# Patient Record
Sex: MALE | Race: BLACK OR AFRICAN AMERICAN | NOT HISPANIC OR LATINO | Employment: FULL TIME | URBAN - NONMETROPOLITAN AREA
[De-identification: names, ages, dates, MRNs, and addresses within clinical notes are randomized per-mention and may not be internally consistent; named-entity substitution may affect disease eponyms.]

---
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
## 2022-02-01 ENCOUNTER — LAB (OUTPATIENT)
Dept: LAB | Facility: HOSPITAL | Age: 22
End: 2022-02-01

## 2022-02-01 ENCOUNTER — OFFICE VISIT (OUTPATIENT)
Dept: FAMILY MEDICINE CLINIC | Facility: CLINIC | Age: 22
End: 2022-02-01

## 2022-02-01 VITALS
HEIGHT: 70 IN | WEIGHT: 150.5 LBS | OXYGEN SATURATION: 99 % | SYSTOLIC BLOOD PRESSURE: 130 MMHG | TEMPERATURE: 97.1 F | BODY MASS INDEX: 21.55 KG/M2 | DIASTOLIC BLOOD PRESSURE: 68 MMHG | HEART RATE: 86 BPM

## 2022-02-01 DIAGNOSIS — A64 STD (MALE): ICD-10-CM

## 2022-02-01 DIAGNOSIS — A64 STD (MALE): Primary | ICD-10-CM

## 2022-02-01 DIAGNOSIS — F98.8 MASTURBATION: ICD-10-CM

## 2022-02-01 LAB — KOH PREP NAIL: NORMAL

## 2022-02-01 PROCEDURE — 87591 N.GONORRHOEAE DNA AMP PROB: CPT

## 2022-02-01 PROCEDURE — 87491 CHLMYD TRACH DNA AMP PROBE: CPT

## 2022-02-01 PROCEDURE — 87086 URINE CULTURE/COLONY COUNT: CPT

## 2022-02-01 PROCEDURE — 87661 TRICHOMONAS VAGINALIS AMPLIF: CPT

## 2022-02-01 PROCEDURE — 81003 URINALYSIS AUTO W/O SCOPE: CPT

## 2022-02-01 PROCEDURE — 86592 SYPHILIS TEST NON-TREP QUAL: CPT

## 2022-02-01 PROCEDURE — 36415 COLL VENOUS BLD VENIPUNCTURE: CPT

## 2022-02-01 PROCEDURE — 86696 HERPES SIMPLEX TYPE 2 TEST: CPT

## 2022-02-01 PROCEDURE — 86695 HERPES SIMPLEX TYPE 1 TEST: CPT

## 2022-02-01 PROCEDURE — G0432 EIA HIV-1/HIV-2 SCREEN: HCPCS

## 2022-02-01 PROCEDURE — 87220 TISSUE EXAM FOR FUNGI: CPT | Performed by: STUDENT IN AN ORGANIZED HEALTH CARE EDUCATION/TRAINING PROGRAM

## 2022-02-01 PROCEDURE — 99203 OFFICE O/P NEW LOW 30 MIN: CPT | Performed by: STUDENT IN AN ORGANIZED HEALTH CARE EDUCATION/TRAINING PROGRAM

## 2022-02-01 NOTE — PROGRESS NOTES
Family Medicine Residency  Pilar Brumfield MD    Subjective:     Nain Ferreira Jr. is a 21 y.o. male who presents for concern for STD.  Patient states that for the past week he has noticed white fluid come out from his penis.  States that after a few days it has been hurting him because has felt sticky and drying.  Earlier today he noticed some blood after urinating; he got concerned so he came to the PCP.  He is sexually active with females only.  In the past year he has had 3 sexual partners.  Reports inconsistent use of condoms.  Has dysuria when he urinates.  He states that after he broke up with his last partner, he started masturbating 3-4 times a day because he was bored on quarantine for COVID-19 infection    The following portions of the patient's history were reviewed and updated as appropriate: allergies, current medications, past family history, past medical history, past social history, past surgical history and problem list.    Past Medical Hx:  Past Medical History:   Diagnosis Date   • Acute gastroenteritis    • Acute pharyngitis    • Ankle pain    • Astigmatism    • Bronchitis    • Conjunctivitis    • COVID-19    • Insect bite     wound - History of bee sting, right eye   • Myopia    • Otitis media    • Sprain of ankle    • Tinea capitis    • Upper respiratory infection    • Urinary tract disease    • Urinary tract infectious disease    • Vulvovaginitis     HISTORY OF       Past Surgical Hx:  Past Surgical History:   Procedure Laterality Date   • WRIST SURGERY Right        Current Meds:  No current outpatient medications on file.    Allergies:  No Known Allergies    Family Hx:  Family History   Problem Relation Age of Onset   • Cancer Other    • Diabetes Other    • Heart disease Other    • Glaucoma Other         Social History:  Social History     Socioeconomic History   • Marital status: Single   Tobacco Use   • Smoking status: Never Smoker   • Smokeless tobacco: Never Used   Substance and  "Sexual Activity   • Alcohol use: No   • Drug use: No       Review of Systems  Review of Systems   Constitutional: Negative for chills and fever.   HENT: Negative for facial swelling, nosebleeds and trouble swallowing.    Eyes: Negative for photophobia and visual disturbance.   Respiratory: Negative for choking and stridor.    Gastrointestinal: Negative for abdominal distention, diarrhea, nausea and vomiting.   Genitourinary: Positive for dysuria, penile discharge and penile pain. Negative for difficulty urinating.   Musculoskeletal: Negative for arthralgias, gait problem and myalgias.   Skin: Negative for pallor and rash.   Neurological: Negative for seizures and syncope.   Psychiatric/Behavioral: Negative for dysphoric mood and hallucinations.       Objective:     /68   Pulse 86   Temp 97.1 °F (36.2 °C)   Ht 177.8 cm (70\")   Wt 68.3 kg (150 lb 8 oz)   SpO2 99%   BMI 21.59 kg/m²   Physical Exam  Constitutional:       General: He is not in acute distress.     Appearance: He is well-developed.   HENT:      Head: Normocephalic and atraumatic.      Nose: Nose normal.   Eyes:      Conjunctiva/sclera: Conjunctivae normal.      Pupils: Pupils are equal, round, and reactive to light.   Cardiovascular:      Rate and Rhythm: Normal rate and regular rhythm.      Pulses: Normal pulses.      Heart sounds: Normal heart sounds.   Pulmonary:      Effort: Pulmonary effort is normal.   Abdominal:      General: Bowel sounds are normal.      Palpations: Abdomen is soft.   Genitourinary:     Penis: Normal.       Testes: Normal.   Musculoskeletal:         General: Normal range of motion.      Cervical back: Normal range of motion and neck supple.   Skin:     General: Skin is warm and dry.   Neurological:      Mental Status: He is alert. Mental status is at baseline.      Cranial Nerves: No cranial nerve deficit.      Coordination: Coordination normal.   Psychiatric:         Mood and Affect: Mood normal.         Behavior: " Behavior normal.          Assessment/Plan:     Diagnoses and all orders for this visit:    1. STD (male) (Primary)  -     HIV-1/O/2 ANTIGEN/ANTIBODY, 4TH GENERATION; Future  -     HSV 1 and 2-Specific Ab, IgG; Future  -     Wet Prep, Genital - Swab, Penis; Future  -     Trichomonas vaginalis, PCR - Urine, Urine, Clean Catch; Future  -     KOH Prep - Urine, Urine, Random Void  -     RPR; Future  -     Chlamydia trachomatis, Neisseria gonorrhoeae, Trichomonas vaginalis, PCR - Urine, Urine, Clean Catch; Future    2. Masturbation  -     Urinalysis With Microscopic - Urine, Clean Catch; Future  -     Urinalysis With Culture If Indicated - Urine, Clean Catch; Future      -Will complete STD panel and urinalysis today.  Counseled on safe sex activities including consistent use of condoms.    Follow-up:     Return in about 4 weeks (around 3/1/2022) for Annual.    Preventative:  Health Maintenance   Topic Date Due   • ANNUAL PHYSICAL  Never done   • COVID-19 Vaccine (1) Never done   • HPV VACCINES (1 - Male 2-dose series) Never done   • HEPATITIS C SCREENING  Never done   • INFLUENZA VACCINE  Never done   • TDAP/TD VACCINES (2 - Td or Tdap) 07/19/2022   • Pneumococcal Vaccine 0-64  Aged Out   • MENINGOCOCCAL VACCINE  Aged Out         Alcohol use:  reports no history of alcohol use.  Nicotine status  reports that he has never smoked. He has never used smokeless tobacco.    Goals    None         RISK SCORE: 3          PGY-3  [facility]  This document has been electronically signed by Pilar Brumfield MD on February 1, 2022 16:06 CST

## 2022-02-02 ENCOUNTER — TELEPHONE (OUTPATIENT)
Dept: FAMILY MEDICINE CLINIC | Facility: CLINIC | Age: 22
End: 2022-02-02

## 2022-02-02 DIAGNOSIS — A54.9 NEISSERIA GONORRHEAE: Primary | ICD-10-CM

## 2022-02-02 DIAGNOSIS — N34.2 URETHRITIS: Primary | ICD-10-CM

## 2022-02-02 LAB
BILIRUB UR QL STRIP: NEGATIVE
C TRACH RRNA CVX QL NAA+PROBE: NEGATIVE
CLARITY UR: CLEAR
COLOR UR: YELLOW
GLUCOSE UR STRIP-MCNC: NEGATIVE MG/DL
HGB UR QL STRIP.AUTO: NEGATIVE
HIV1+2 AB SER QL: NORMAL
KETONES UR QL STRIP: ABNORMAL
LEUKOCYTE ESTERASE UR QL STRIP.AUTO: ABNORMAL
N GONORRHOEA RRNA SPEC QL NAA+PROBE: POSITIVE
NITRITE UR QL STRIP: NEGATIVE
PH UR STRIP.AUTO: 6.5 [PH] (ref 5–8)
PROT UR QL STRIP: ABNORMAL
RPR SER QL: NORMAL
SP GR UR STRIP: >1.03 (ref 1–1.03)
UROBILINOGEN UR QL STRIP: ABNORMAL

## 2022-02-02 RX ORDER — AZITHROMYCIN 500 MG/1
2000 TABLET, FILM COATED ORAL ONCE
Qty: 4 TABLET | Refills: 0 | Status: SHIPPED | OUTPATIENT
Start: 2022-02-02 | End: 2022-02-02

## 2022-02-02 RX ORDER — CEFTRIAXONE 1 G/1
500 INJECTION, POWDER, FOR SOLUTION INTRAMUSCULAR; INTRAVENOUS ONCE
Status: COMPLETED | OUTPATIENT
Start: 2022-02-04 | End: 2022-02-07

## 2022-02-02 RX ORDER — AZITHROMYCIN 500 MG/1
1000 TABLET, FILM COATED ORAL DAILY
Qty: 2 TABLET | Refills: 0 | Status: SHIPPED | OUTPATIENT
Start: 2022-02-02 | End: 2022-02-03

## 2022-02-02 NOTE — TELEPHONE ENCOUNTER
Young man seen in the office yesterday for urethral discharge and discomfort.  GC swab was positive.  Chlamydia was negative.  Dr. Dunn was off sick today and I was asked to notify the patient.  Called multiple times and this is an invalid number.  His work #[id number] is also invalid.  We were going to send zithromax 2000mg to pharmacy.  Dr Brumfield will also try to reach patient.  .  A letter will be sent for him to call for the results of his labs

## 2022-02-02 NOTE — PROGRESS NOTES
I have spoken with the patient .     I have reviewed the notes, assessments, and/or procedures performed by Dr. Pilar Brumfield,   I concur with   his  documentation and assessment and plan for Nain Ferreira Jr..          This document has been electronically signed by Mohsen Sargent MD on February 2, 2022 09:07 CST

## 2022-02-03 LAB
BACTERIA SPEC AEROBE CULT: NO GROWTH
HSV1 IGG SER IA-ACNC: <0.91 INDEX (ref 0–0.9)
HSV2 IGG SER IA-ACNC: <0.91 INDEX (ref 0–0.9)
T VAGINALIS DNA SPEC QL NAA+PROBE: NEGATIVE
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
## 2022-02-07 ENCOUNTER — CLINICAL SUPPORT (OUTPATIENT)
Dept: FAMILY MEDICINE CLINIC | Facility: CLINIC | Age: 22
End: 2022-02-07

## 2022-02-07 ENCOUNTER — TELEPHONE (OUTPATIENT)
Dept: FAMILY MEDICINE CLINIC | Facility: CLINIC | Age: 22
End: 2022-02-07

## 2022-02-07 PROCEDURE — 96372 THER/PROPH/DIAG INJ SC/IM: CPT | Performed by: STUDENT IN AN ORGANIZED HEALTH CARE EDUCATION/TRAINING PROGRAM

## 2022-02-07 RX ADMIN — CEFTRIAXONE 500 MG: 1 INJECTION, POWDER, FOR SOLUTION INTRAMUSCULAR; INTRAVENOUS at 15:30
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
## 2022-02-11 ENCOUNTER — TELEPHONE (OUTPATIENT)
Dept: FAMILY MEDICINE CLINIC | Facility: CLINIC | Age: 22
End: 2022-02-11

## 2022-02-11 NOTE — TELEPHONE ENCOUNTER
This patient has called stating he received a letter from Dr Brumfield, that read his test results were ready.   Please return his call at [phone].    Thank you,  Leslee

## 2022-05-24 ENCOUNTER — OFFICE VISIT (OUTPATIENT)
Dept: FAMILY MEDICINE CLINIC | Facility: CLINIC | Age: 22
End: 2022-05-24

## 2022-05-24 VITALS
DIASTOLIC BLOOD PRESSURE: 72 MMHG | SYSTOLIC BLOOD PRESSURE: 124 MMHG | HEART RATE: 96 BPM | OXYGEN SATURATION: 98 % | WEIGHT: 157.3 LBS | BODY MASS INDEX: 22.52 KG/M2 | HEIGHT: 70 IN

## 2022-05-24 DIAGNOSIS — F33.1 MODERATE EPISODE OF RECURRENT MAJOR DEPRESSIVE DISORDER: Primary | ICD-10-CM

## 2022-05-24 DIAGNOSIS — Z72.0 TOBACCO USE: ICD-10-CM

## 2022-05-24 DIAGNOSIS — Z00.00 HEALTH CARE MAINTENANCE: ICD-10-CM

## 2022-05-24 PROCEDURE — 99213 OFFICE O/P EST LOW 20 MIN: CPT | Performed by: STUDENT IN AN ORGANIZED HEALTH CARE EDUCATION/TRAINING PROGRAM

## 2022-05-24 RX ORDER — IBUPROFEN 800 MG/1
800 TABLET ORAL
COMMUNITY
End: 2022-10-29

## 2022-05-24 RX ORDER — FLUOXETINE 10 MG/1
10 CAPSULE ORAL DAILY
Qty: 30 CAPSULE | Refills: 0 | Status: SHIPPED | OUTPATIENT
Start: 2022-05-24 | End: 2022-06-27 | Stop reason: SDUPTHER

## 2022-05-24 NOTE — PROGRESS NOTES
Family Medicine Residency  Pilar Brumfield MD    Subjective:     Nain Ferreira Jr. is a 21 y.o. male who presents for:     Depression: phq 15. Previously had thoughts of harming self but never acted upon it. No suicidal issues now. Depressive Issues related to financial, personal life, relationships, normal every day things.     Tobacco use: vapes. Smoker since 17. 1 pack per day but recently cut down to 1-2 cigs per week but now picked up vaping.     Substance use: Marijuana every day. Multiple times a day to help build an appetite and fall asleep.     The following portions of the patient's history were reviewed and updated as appropriate: allergies, current medications, past family history, past medical history, past social history, past surgical history and problem list.    Past Medical Hx:  Past Medical History:   Diagnosis Date   • Acute gastroenteritis    • Acute pharyngitis    • Ankle pain    • Astigmatism    • Bronchitis    • Conjunctivitis    • COVID-19    • Insect bite     wound - History of bee sting, right eye   • Myopia    • Otitis media    • Sprain of ankle    • Tinea capitis    • Upper respiratory infection    • Urinary tract disease    • Urinary tract infectious disease    • Vulvovaginitis     HISTORY OF       Past Surgical Hx:  Past Surgical History:   Procedure Laterality Date   • SHOULDER SURGERY Left 2018   • WRIST SURGERY Right        Current Meds:    Current Outpatient Medications:   •  FLUoxetine (PROzac) 10 MG capsule, Take 1 capsule by mouth Daily for 30 days., Disp: 30 capsule, Rfl: 0  •  ibuprofen (ADVIL,MOTRIN) 800 MG tablet, Take 800 mg by mouth., Disp: , Rfl:   •  nicotine (Nicoderm CQ) 7 MG/24HR patch, Place 1 patch on the skin as directed by provider Daily for 30 days., Disp: 30 patch, Rfl: 0    Allergies:  No Known Allergies    Family Hx:  Family History   Problem Relation Age of Onset   • Cancer Other    • Diabetes Other    • Heart disease Other    • Glaucoma Other      "    Social History:  Social History     Socioeconomic History   • Marital status: Single   Tobacco Use   • Smoking status: Current Every Day Smoker   • Smokeless tobacco: Never Used   Vaping Use   • Vaping Use: Every day   Substance and Sexual Activity   • Alcohol use: No   • Drug use: No   • Sexual activity: Yes       Review of Systems  Review of Systems   Constitutional: Negative for chills and fever.   HENT: Negative for facial swelling, nosebleeds and trouble swallowing.    Eyes: Negative for photophobia and visual disturbance.   Respiratory: Negative for choking and stridor.    Gastrointestinal: Negative for abdominal distention, diarrhea, nausea and vomiting.   Genitourinary: Negative for difficulty urinating and dysuria.   Musculoskeletal: Negative for arthralgias, gait problem and myalgias.   Skin: Negative for pallor and rash.   Neurological: Negative for seizures and syncope.   Psychiatric/Behavioral: Negative for dysphoric mood and hallucinations.       Objective:     /72   Pulse 96   Ht 177.8 cm (70\")   Wt 71.4 kg (157 lb 4.8 oz)   SpO2 98%   BMI 22.57 kg/m²   Physical Exam  Constitutional:       General: He is not in acute distress.     Appearance: He is well-developed.   HENT:      Head: Normocephalic and atraumatic.      Nose: Nose normal.   Eyes:      Conjunctiva/sclera: Conjunctivae normal.      Pupils: Pupils are equal, round, and reactive to light.   Cardiovascular:      Rate and Rhythm: Normal rate and regular rhythm.      Pulses: Normal pulses.      Heart sounds: Normal heart sounds.   Pulmonary:      Effort: Pulmonary effort is normal.   Abdominal:      General: Bowel sounds are normal.      Palpations: Abdomen is soft.   Musculoskeletal:         General: Normal range of motion.      Cervical back: Normal range of motion and neck supple.   Skin:     General: Skin is warm and dry.   Neurological:      Mental Status: He is alert. Mental status is at baseline.      Cranial Nerves: No " cranial nerve deficit.      Coordination: Coordination normal.   Psychiatric:         Mood and Affect: Mood normal.         Behavior: Behavior normal.          Assessment/Plan:     Diagnoses and all orders for this visit:    1. Moderate episode of recurrent major depressive disorder (HCC) (Primary)  -     FLUoxetine (PROzac) 10 MG capsule; Take 1 capsule by mouth Daily for 30 days.  Dispense: 30 capsule; Refill: 0  -     Ambulatory Referral to Psychology  -     Ambulatory Referral to Behavioral Health    2. Health care maintenance  -     Hepatitis C antibody; Future    3. Tobacco use  -     nicotine (Nicoderm CQ) 7 MG/24HR patch; Place 1 patch on the skin as directed by provider Daily for 30 days.  Dispense: 30 patch; Refill: 0        -Provided Std counseling, advised on safe sex  -Start low dose prozac and refer to behavioral health  -Trial Nicotine patch     Follow-up:     Return in about 6 years (around 5/24/2028) for Recheck.    Preventative:  Health Maintenance   Topic Date Due   • Pneumococcal Vaccine 0-64 (1 - PPSV23 or PCV20) 04/01/2003   • ANNUAL PHYSICAL  Never done   • COVID-19 Vaccine (1) Never done   • HPV VACCINES (1 - Male 2-dose series) Never done   • HEPATITIS C SCREENING  Never done   • TDAP/TD VACCINES (2 - Td or Tdap) 07/19/2022   • INFLUENZA VACCINE  08/01/2022   • MENINGOCOCCAL VACCINE  Aged Out       Alcohol use:  reports no history of alcohol use.  Nicotine status  reports that he has been smoking. He has never used smokeless tobacco.     Goals    None         RISK SCORE: 3          PGY-3  [facility] Family Medicine Residency  This document has been electronically signed by Pilar Brumfield MD on May 27, 2022 12:39 CDT

## 2022-05-25 ENCOUNTER — TELEPHONE (OUTPATIENT)
Dept: FAMILY MEDICINE CLINIC | Facility: CLINIC | Age: 22
End: 2022-05-25

## 2022-06-27 DIAGNOSIS — F33.1 MODERATE EPISODE OF RECURRENT MAJOR DEPRESSIVE DISORDER: ICD-10-CM

## 2022-06-27 RX ORDER — FLUOXETINE 10 MG/1
10 CAPSULE ORAL DAILY
Qty: 30 CAPSULE | Refills: 0 | Status: SHIPPED | OUTPATIENT
Start: 2022-06-27 | End: 2022-06-28 | Stop reason: SDUPTHER

## 2022-06-28 DIAGNOSIS — F33.1 MODERATE EPISODE OF RECURRENT MAJOR DEPRESSIVE DISORDER: ICD-10-CM

## 2022-06-28 RX ORDER — FLUOXETINE 10 MG/1
10 CAPSULE ORAL DAILY
Qty: 30 CAPSULE | Refills: 0 | OUTPATIENT
Start: 2022-06-28 | End: 2022-10-29

## 2022-09-02 ENCOUNTER — OFFICE VISIT (OUTPATIENT)
Dept: FAMILY MEDICINE CLINIC | Facility: CLINIC | Age: 22
End: 2022-09-02

## 2022-09-02 VITALS
DIASTOLIC BLOOD PRESSURE: 70 MMHG | HEIGHT: 70 IN | SYSTOLIC BLOOD PRESSURE: 120 MMHG | BODY MASS INDEX: 23.08 KG/M2 | OXYGEN SATURATION: 99 % | TEMPERATURE: 96.2 F | HEART RATE: 79 BPM | WEIGHT: 161.2 LBS

## 2022-09-02 DIAGNOSIS — Z20.2 TRICHOMONAS EXPOSURE: Primary | ICD-10-CM

## 2022-09-02 PROCEDURE — 99213 OFFICE O/P EST LOW 20 MIN: CPT | Performed by: STUDENT IN AN ORGANIZED HEALTH CARE EDUCATION/TRAINING PROGRAM

## 2022-09-02 RX ORDER — METRONIDAZOLE 500 MG/1
2000 TABLET ORAL ONCE
Qty: 4 TABLET | Refills: 0 | Status: SHIPPED | OUTPATIENT
Start: 2022-09-02 | End: 2022-09-02

## 2022-09-02 NOTE — PROGRESS NOTES
Family Medicine Residency  Pato Mcgee MD    Subjective:     Nain Ferreira Jr. is a 21 y.o. male who presents for trichomonas exposure. Patient has no symptoms. Girlfriend has confirmed trichomoniasis.    Guidelines recommend treating sex partners of patients with confirmed trichomoniasis rather than observation. For males, guidelines recommend single dose treatment with metronidazole 2 grams taken once.     Medication ordered. Patient can follow up PRN.     Patient declines other STD testing.     The following portions of the patient's history were reviewed and updated as appropriate: allergies, current medications, past family history, past medical history, past social history, past surgical history and problem list.    Past Medical Hx:  Past Medical History:   Diagnosis Date   • Acute gastroenteritis    • Acute pharyngitis    • Ankle pain    • Astigmatism    • Bronchitis    • Conjunctivitis    • COVID-19    • Insect bite     wound - History of bee sting, right eye   • Myopia    • Otitis media    • Sprain of ankle    • Tinea capitis    • Upper respiratory infection    • Urinary tract disease    • Urinary tract infectious disease    • Vulvovaginitis     HISTORY OF       Past Surgical Hx:  Past Surgical History:   Procedure Laterality Date   • SHOULDER SURGERY Left 2018   • WRIST SURGERY Right        Current Meds:    Current Outpatient Medications:   •  ibuprofen (ADVIL,MOTRIN) 800 MG tablet, Take 800 mg by mouth., Disp: , Rfl:   •  FLUoxetine (PROzac) 10 MG capsule, Take 1 capsule by mouth Daily for 30 days., Disp: 30 capsule, Rfl: 0  •  metroNIDAZOLE (Flagyl) 500 MG tablet, Take 4 tablets by mouth 1 (One) Time for 1 dose., Disp: 4 tablet, Rfl: 0    Allergies:  No Known Allergies    Family Hx:  Family History   Problem Relation Age of Onset   • Cancer Other    • Diabetes Other    • Heart disease Other    • Glaucoma Other         Social History:  Social History     Socioeconomic History   • Marital status:  "Single   Tobacco Use   • Smoking status: Current Every Day Smoker   • Smokeless tobacco: Never Used   Vaping Use   • Vaping Use: Every day   Substance and Sexual Activity   • Alcohol use: No   • Drug use: No   • Sexual activity: Yes       Review of Systems  Review of Systems   Genitourinary: Negative for dysuria, penile discharge, penile pain, penile swelling, scrotal swelling and testicular pain.       Objective:     /70   Pulse 79   Temp 96.2 °F (35.7 °C)   Ht 177.8 cm (70\")   Wt 73.1 kg (161 lb 3.2 oz)   SpO2 99%   BMI 23.13 kg/m²   Physical Exam  Constitutional:       General: He is not in acute distress.     Appearance: Normal appearance. He is not ill-appearing, toxic-appearing or diaphoretic.   Pulmonary:      Effort: Pulmonary effort is normal. No respiratory distress.   Neurological:      Mental Status: He is alert.   Psychiatric:         Mood and Affect: Mood normal.         Behavior: Behavior normal.          Assessment/Plan:     Diagnoses and all orders for this visit:    1. Trichomonas exposure (Primary)      Nain Ferreira Jr. is a 21 y.o. male who presents for trichomonas exposure. Patient has no symptoms. Girlfriend has confirmed trichomoniasis.    Guidelines recommend treating sex partners of patients with confirmed trichomoniasis rather than observation. For males, guidelines recommend single dose treatment with metronidazole 2 grams taken once.     Medication ordered. Patient can follow up PRN.     Patient declines other STD testing.       -     metroNIDAZOLE (Flagyl) 500 MG tablet; Take 4 tablets by mouth 1 (One) Time for 1 dose.  Dispense: 4 tablet; Refill: 0        · Rx changes: one time 2 gram dose of metronidazole to treat male sexual partner of confirmed trichomoniasis infected woman      Follow-up:     Return if symptoms worsen or fail to improve.    Preventative:  Health Maintenance   Topic Date Due   • COVID-19 Vaccine (1) Never done   • Pneumococcal Vaccine 0-64 (1 - " PPSV23 or PCV20) 04/01/2003   • ANNUAL PHYSICAL  Never done   • HPV VACCINES (1 - Male 2-dose series) Never done   • HEPATITIS C SCREENING  Never done   • TDAP/TD VACCINES (2 - Td or Tdap) 07/19/2022   • INFLUENZA VACCINE  10/01/2022   • MENINGOCOCCAL VACCINE  Aged Out         Alcohol use:  reports no history of alcohol use.  Nicotine status  reports that he has been smoking. He has never used smokeless tobacco.     Goals    None         RISK SCORE: 4      This document has been electronically signed by Pato Mcgee MD on September 2, 2022 14:31 CDT

## 2022-09-02 NOTE — PROGRESS NOTES
I have reviewed the patient.  I have reviewed the notes, assessments, and/or procedures performed by Dr Pato Mcgee, I concur with his  documentation and assessment and plan for Nain Ferreira Jr..          This document has been electronically signed by Mohsen Sargent MD on September 2, 2022 16:09 CDT

## 2022-10-28 ENCOUNTER — HOSPITAL ENCOUNTER (EMERGENCY)
Facility: HOSPITAL | Age: 22
Discharge: HOME | End: 2022-10-29
Attending: EMERGENCY MEDICINE | Admitting: EMERGENCY MEDICINE

## 2022-10-28 DIAGNOSIS — S46.911A STRAIN OF RIGHT SHOULDER, INITIAL ENCOUNTER: Primary | ICD-10-CM

## 2022-10-28 PROCEDURE — 99283 EMERGENCY DEPT VISIT LOW MDM: CPT

## 2022-10-28 RX ORDER — HYDROCODONE BITARTRATE AND ACETAMINOPHEN 5; 325 MG/1; MG/1
1 TABLET ORAL ONCE
Status: COMPLETED | OUTPATIENT
Start: 2022-10-28 | End: 2022-10-29

## 2022-10-29 ENCOUNTER — APPOINTMENT (OUTPATIENT)
Dept: GENERAL RADIOLOGY | Facility: HOSPITAL | Age: 22
End: 2022-10-29

## 2022-10-29 VITALS
HEIGHT: 70 IN | DIASTOLIC BLOOD PRESSURE: 71 MMHG | BODY MASS INDEX: 22.9 KG/M2 | TEMPERATURE: 98.6 F | OXYGEN SATURATION: 100 % | HEART RATE: 65 BPM | RESPIRATION RATE: 18 BRPM | SYSTOLIC BLOOD PRESSURE: 125 MMHG | WEIGHT: 160 LBS

## 2022-10-29 PROCEDURE — 73030 X-RAY EXAM OF SHOULDER: CPT

## 2022-10-29 RX ADMIN — HYDROCODONE BITARTRATE AND ACETAMINOPHEN 1 TABLET: 5; 325 TABLET ORAL at 00:22
